# Patient Record
Sex: MALE | Race: WHITE | NOT HISPANIC OR LATINO | URBAN - METROPOLITAN AREA
[De-identification: names, ages, dates, MRNs, and addresses within clinical notes are randomized per-mention and may not be internally consistent; named-entity substitution may affect disease eponyms.]

---

## 2019-09-24 ENCOUNTER — OUTPATIENT (OUTPATIENT)
Dept: OUTPATIENT SERVICES | Facility: HOSPITAL | Age: 59
LOS: 1 days | End: 2019-09-24
Payer: COMMERCIAL

## 2019-09-24 ENCOUNTER — APPOINTMENT (OUTPATIENT)
Dept: WOUND CARE | Facility: CLINIC | Age: 59
End: 2019-09-24

## 2019-09-24 VITALS — SYSTOLIC BLOOD PRESSURE: 142 MMHG | TEMPERATURE: 97.7 F | HEART RATE: 58 BPM | DIASTOLIC BLOOD PRESSURE: 86 MMHG

## 2019-09-24 DIAGNOSIS — Z86.69 PERSONAL HISTORY OF OTHER DISEASES OF THE NERVOUS SYSTEM AND SENSE ORGANS: ICD-10-CM

## 2019-09-24 DIAGNOSIS — Z82.49 FAMILY HISTORY OF ISCHEMIC HEART DISEASE AND OTHER DISEASES OF THE CIRCULATORY SYSTEM: ICD-10-CM

## 2019-09-24 DIAGNOSIS — Z78.9 OTHER SPECIFIED HEALTH STATUS: ICD-10-CM

## 2019-09-24 DIAGNOSIS — Z00.00 ENCOUNTER FOR GENERAL ADULT MEDICAL EXAMINATION WITHOUT ABNORMAL FINDINGS: ICD-10-CM

## 2019-09-24 PROCEDURE — 11042 DBRDMT SUBQ TIS 1ST 20SQCM/<: CPT

## 2019-09-24 PROCEDURE — G0463: CPT

## 2019-09-24 RX ORDER — GABAPENTIN 300 MG/1
300 CAPSULE ORAL
Refills: 0 | Status: ACTIVE | COMMUNITY

## 2019-09-24 RX ORDER — ASPIRIN 81 MG/1
81 TABLET, CHEWABLE ORAL
Refills: 0 | Status: ACTIVE | COMMUNITY

## 2019-09-24 NOTE — PLAN
[FreeTextEntry1] : subjective:\par Vascular: DP/PT pulses palpable, varicosities noted bilateral foot. +2 pitting edma. \par Derm: open lesion to the right lateral heel area, no surrounding erythema. scant serous drainage. macerated skin border, wound base is granular. \par Neuro: gross sensation intact, light touch intact. \par MSK: wound is painful to touch. bilateral bunion deformities with rigidly contracted toes. Patient has limb lenth discrepency. left extremity is shorter by 1/2 inch. \par \par A: left lateral heel wound\par post-phlebith syndrome \par \par P:\par Patient examined and chart reviewed\par left foot scrubed and cleaned with chlorhexide. \par Patient unable to tolerate sharp debridement \par biofilm and slough cleaned off with gauze, patient tolerated it well. \par Wound dressed with iodosorb, adaptic and DSD with moderate compression from ACE wrapped. \par Leave dressing clean, dry and intact for the next 2 days. \par then pt. may change the dressing and applied DSD to the area. \par Instructed patient to weight bear as tolerated, stay off of it as much as possible \par RTC in 1 weeks for f/u wound care.

## 2019-09-24 NOTE — PHYSICAL EXAM
[FreeTextEntry1] : lateral heel [FreeTextEntry2] : 2.0cm [FreeTextEntry3] : 0.2cm [FreeTextEntry4] : 0.1cm [TWNoteComboBox1] : Right [TWNoteComboBox2] : 1 [TWNoteComboBox4] : None [TWNoteComboBox6] : Other [de-identified] : None [de-identified] : None [de-identified] : >75%

## 2019-09-24 NOTE — HISTORY OF PRESENT ILLNESS
[FreeTextEntry1] : 57 yo male,pt who presents for initial evaluation of right foot heel wound. Wound has been present for over 6 months, which initiated as a clot.  hx. of DVT last year. He is not on any medications for diabetes, his PCP is running test for DM diagnosis, he is referred by his podiatrist he was last seen there about 2 weeks ago. He has been dressing it with antibiotics ointment and tape. pt. states the wound has been improving. Pt is on gabapentin 300mg. Pt works as a . \par \par \par INDRADA

## 2019-10-01 ENCOUNTER — OUTPATIENT (OUTPATIENT)
Dept: OUTPATIENT SERVICES | Facility: HOSPITAL | Age: 59
LOS: 1 days | End: 2019-10-01
Payer: COMMERCIAL

## 2019-10-01 ENCOUNTER — APPOINTMENT (OUTPATIENT)
Dept: WOUND CARE | Facility: CLINIC | Age: 59
End: 2019-10-01

## 2019-10-01 VITALS
RESPIRATION RATE: 18 BRPM | TEMPERATURE: 97.6 F | HEART RATE: 61 BPM | SYSTOLIC BLOOD PRESSURE: 144 MMHG | DIASTOLIC BLOOD PRESSURE: 72 MMHG | OXYGEN SATURATION: 98 %

## 2019-10-01 DIAGNOSIS — L97.412 NON-PRESSURE CHRONIC ULCER OF RIGHT HEEL AND MIDFOOT WITH FAT LAYER EXPOSED: ICD-10-CM

## 2019-10-01 DIAGNOSIS — Z00.00 ENCOUNTER FOR GENERAL ADULT MEDICAL EXAMINATION WITHOUT ABNORMAL FINDINGS: ICD-10-CM

## 2019-10-01 PROCEDURE — G0463: CPT

## 2019-10-01 PROCEDURE — 11042 DBRDMT SUBQ TIS 1ST 20SQCM/<: CPT

## 2019-10-01 NOTE — PLAN
[FreeTextEntry1] : subjective:\par Vascular: DP/PT pulses palpable, varicosities noted bilateral foot. +2 pitting edma. \par Derm: previous open lesion to the right lateral heel area now healed with scab, no surrounding erythema. scant serous drainage. macerated skin border, wound base is granular. \par Neuro: gross sensation intact, light touch intact. \par MSK: wound is painful to touch. bilateral bunion deformities with rigidly contracted toes. Patient has limb lenth discrepency. left extremity is shorter by 1/2 inch. \par \par A: left lateral heel wound\par post-phlebith syndrome \par \par P:\par Patient examined and chart reviewed\par left foot scrubed and cleaned with chlorhexide. \par Patient unable to tolerate sharp debridement, scab left intact\par DSD applied to wound area. \par Instructed patient to weight bear as tolerated, stay off of it as much as possible \par RTC in 2 weeks for f/u wound care.

## 2019-10-01 NOTE — PHYSICAL EXAM
[TWNoteComboBox2] : False [TWNoteComboBox1] : False [TWNoteComboBox4] : None [TWNoteComboBox6] : Other [de-identified] : None [de-identified] : None [de-identified] : >75%

## 2019-10-01 NOTE — HISTORY OF PRESENT ILLNESS
[FreeTextEntry1] : 57 yo male,pt who presents for f/u of right foot heel wound. Wound has been present for over 6 months, which initiated as a clot.  hx. of DVT last year. He is not on any medications for diabetes, his PCP is running test for DM diagnosis, he is referred by his podiatrist he was last seen there about 2 weeks ago. He has been dressing it with antibiotics ointment and tape. pt. states the wound has been improving. Pt is on gabapentin 300mg. Pt works as a . Pt. has taken the last week off of work to allow heeling. \par \par \par NKDA

## 2019-10-04 DIAGNOSIS — L97.312 NON-PRESSURE CHRONIC ULCER OF RIGHT ANKLE WITH FAT LAYER EXPOSED: ICD-10-CM

## 2019-10-15 ENCOUNTER — APPOINTMENT (OUTPATIENT)
Dept: WOUND CARE | Facility: CLINIC | Age: 59
End: 2019-10-15

## 2019-11-07 ENCOUNTER — APPOINTMENT (OUTPATIENT)
Dept: WOUND CARE | Facility: CLINIC | Age: 59
End: 2019-11-07

## 2020-11-16 ENCOUNTER — APPOINTMENT (OUTPATIENT)
Dept: OPHTHALMOLOGY | Facility: CLINIC | Age: 60
End: 2020-11-16
Payer: SELF-PAY

## 2020-11-16 ENCOUNTER — NON-APPOINTMENT (OUTPATIENT)
Age: 60
End: 2020-11-16

## 2020-11-16 PROCEDURE — 92202 OPSCPY EXTND ON/MAC DRAW: CPT

## 2020-11-16 PROCEDURE — 92004 COMPRE OPH EXAM NEW PT 1/>: CPT

## 2020-11-16 PROCEDURE — 92015 DETERMINE REFRACTIVE STATE: CPT
